# Patient Record
Sex: MALE | Race: AMERICAN INDIAN OR ALASKA NATIVE
[De-identification: names, ages, dates, MRNs, and addresses within clinical notes are randomized per-mention and may not be internally consistent; named-entity substitution may affect disease eponyms.]

---

## 2020-03-13 ENCOUNTER — HOSPITAL ENCOUNTER (OUTPATIENT)
Dept: HOSPITAL 5 - CT | Age: 55
Discharge: HOME | End: 2020-03-13
Attending: RADIOLOGY
Payer: MEDICAID

## 2020-03-13 DIAGNOSIS — I35.0: ICD-10-CM

## 2020-03-13 DIAGNOSIS — I70.219: Primary | ICD-10-CM

## 2020-03-13 LAB — BUN SERPL-MCNC: 20 MG/DL (ref 9–20)

## 2020-03-13 PROCEDURE — 36415 COLL VENOUS BLD VENIPUNCTURE: CPT

## 2020-03-13 PROCEDURE — 75635 CT ANGIO ABDOMINAL ARTERIES: CPT

## 2020-03-13 PROCEDURE — 84520 ASSAY OF UREA NITROGEN: CPT

## 2020-03-13 PROCEDURE — 82565 ASSAY OF CREATININE: CPT

## 2020-03-14 NOTE — CAT SCAN REPORT
CT angio abd/femoral abd aorta



INDICATION:

I70.219Atherosclerosis of native arteries of extremities with int.



TECHNIQUE:

All CT scans at this location are performed using CT dose reduction for ALARA by means of automated e
xposure control.



Precontrast localizer images were obtained, followed by axial and 3-dimensional reconstruction images
, performed at an independent workstation by the CT technologist after IV bolus contrast injection.



COMPARISON:

None available.



FINDINGS:

Exam is suboptimal because of the patient's size.



Abdominal aorta and iliac arteries are mildly atherosclerotic but patent without high-grade stenosis.
 Celiac axis, SMA, bilateral renal arteries and DANGELO are all patent.



Both femoral and popliteal arteries are patent, with three-vessel runoff to both lower legs.



IMPRESSION:

1. Atherosclerosis, but no arterial occlusion or high-grade stenosis. 



Signer Name: Loki Rogers MD 

Signed: 3/14/2020 12:44 AM

Workstation Name: [id number]